# Patient Record
(demographics unavailable — no encounter records)

---

## 2025-03-17 NOTE — HISTORY OF PRESENT ILLNESS
[FreeTextEntry1] : medication refill [de-identified] : Patient would like a refill of albuterol. She feels well. She is due for labs, will be given a script.

## 2025-03-17 NOTE — PLAN
[FreeTextEntry1] : Patient advised to get labs done fasting in the next 1 month. Renewed Albuterol HFA.

## 2025-05-30 NOTE — HISTORY OF PRESENT ILLNESS
[N] : Patient denies prior pregnancies [Normal Amount/Duration] :  normal amount and duration [Regular Cycle Intervals] : periods have been regular [Frequency: Q ___ days] : menstrual periods occur approximately every [unfilled] days [Menarche Age: ____] : age at menarche was [unfilled] [TextBox_4] : 21 -year old  0 LMP May 27, 2025 presents for an annual examination. Review of systems are negative. [LMPDate] : 05/27/2025 [MensesFreq] : 28 [MensesLength] : 7 [PGHxTotal] : 0 [FreeTextEntry1] : 05/27/2025

## 2025-05-30 NOTE — PHYSICAL EXAM
[Chaperoned Physical Exam] : A chaperone was present in the examining room during all aspects of the physical examination. [MA] : MA [Oriented x3] : oriented x3 [Examination Of The Breasts] : a normal appearance [No Masses] : no breast masses were palpable [Labia Majora] : normal on the right [Labia Minora] : normal [Normal] : normal [Uterine Adnexae] : normal [FreeTextEntry3] :  Thyroid is non-enlarged, nontender. No palpable nodules or goiter. No lymphadenopathy. [FreeTextEntry7] :  Soft. Nondistended. Nontender. No rebound or guarding. There are no palpable masses. [FreeTextEntry1] : External genitalia are within normal limits with no lesions visualized. [FreeTextEntry2] : There is a 2 mm hyperpigmented macular lesion noted at the superior aspect of the left labia minora.  [FreeTextEntry4] : Small amount of blood consistent with menstruation. No vaginal discharge or lesions noted within the vaginal vault. [FreeTextEntry5] :  A speculum was inserted without any difficulty. The cervix was normal in appearance. Pap smear and cultures obtained. No cervical motion tenderness. [FreeTextEntry6] : Bimanual examination was notable for a normal, nontender uterus. There were no palpable adnexal masses or adnexal tenderness.

## 2025-05-30 NOTE — PLAN
[FreeTextEntry1] : Wellness exam. Normal physical examination. Recommendations: Ophthalmological, dental and dermatological examinations.   Patient declined contraception. Patient is not sexually active at this time. Recommended condom use for STD prevention. Status post Gardasil Vaccine Reviewed self-breast examination.   Discussed proper nutrition and physical exercise. Reviewed age-appropriate vaccinations.